# Patient Record
Sex: MALE | Race: WHITE | Employment: FULL TIME | URBAN - METROPOLITAN AREA
[De-identification: names, ages, dates, MRNs, and addresses within clinical notes are randomized per-mention and may not be internally consistent; named-entity substitution may affect disease eponyms.]

---

## 2017-06-27 ENCOUNTER — LAB ENCOUNTER (OUTPATIENT)
Dept: LAB | Facility: HOSPITAL | Age: 47
End: 2017-06-27
Payer: COMMERCIAL

## 2017-06-27 ENCOUNTER — HOSPITAL ENCOUNTER (OUTPATIENT)
Dept: CT IMAGING | Facility: HOSPITAL | Age: 47
Discharge: HOME | End: 2017-06-27
Payer: COMMERCIAL

## 2017-06-27 DIAGNOSIS — C69.91 MELANOMA OF EYE, RIGHT (HCC): Primary | ICD-10-CM

## 2017-06-27 DIAGNOSIS — C69.90: ICD-10-CM

## 2017-06-27 PROCEDURE — 80053 COMPREHEN METABOLIC PANEL: CPT

## 2017-06-27 PROCEDURE — 82248 BILIRUBIN DIRECT: CPT

## 2017-06-27 PROCEDURE — 36415 COLL VENOUS BLD VENIPUNCTURE: CPT

## 2017-06-27 PROCEDURE — 74170 CT ABD WO CNTRST FLWD CNTRST: CPT | Performed by: RADIOLOGY

## 2017-06-27 PROCEDURE — 71260 CT THORAX DX C+: CPT | Performed by: RADIOLOGY

## 2017-09-07 ENCOUNTER — OFFICE VISIT (OUTPATIENT)
Dept: FAMILY MEDICINE CLINIC | Facility: CLINIC | Age: 47
End: 2017-09-07

## 2017-09-07 VITALS
WEIGHT: 212 LBS | DIASTOLIC BLOOD PRESSURE: 86 MMHG | HEIGHT: 73 IN | HEART RATE: 61 BPM | OXYGEN SATURATION: 98 % | BODY MASS INDEX: 28.1 KG/M2 | SYSTOLIC BLOOD PRESSURE: 132 MMHG | RESPIRATION RATE: 18 BRPM | TEMPERATURE: 99 F

## 2017-09-07 DIAGNOSIS — M10.071 ACUTE IDIOPATHIC GOUT INVOLVING TOE OF RIGHT FOOT: Primary | ICD-10-CM

## 2017-09-07 PROCEDURE — 99213 OFFICE O/P EST LOW 20 MIN: CPT | Performed by: FAMILY MEDICINE

## 2017-09-07 RX ORDER — PREDNISONE 20 MG/1
TABLET ORAL
Qty: 10 TABLET | Refills: 6 | Status: SHIPPED | OUTPATIENT
Start: 2017-09-07 | End: 2018-08-31

## 2017-09-07 RX ORDER — HYDROCODONE BITARTRATE AND ACETAMINOPHEN 10; 325 MG/1; MG/1
1 TABLET ORAL EVERY 6 HOURS PRN
Qty: 60 TABLET | Refills: 0 | Status: SHIPPED | OUTPATIENT
Start: 2017-09-07 | End: 2020-07-20

## 2017-09-07 NOTE — PROGRESS NOTES
Here for 2 primary reasons. First is to discuss recurrence of gout in his right foot. This time it began in the hindfoot and migrated up to the great toe. Previous x-rays have shown some early bony destruction and cystic changes.   Patient feels he get

## 2018-07-03 ENCOUNTER — HOSPITAL ENCOUNTER (OUTPATIENT)
Dept: CARDIOLOGY CLINIC | Facility: HOSPITAL | Age: 48
Discharge: HOME | End: 2018-07-03
Attending: INTERNAL MEDICINE

## 2018-07-03 DIAGNOSIS — Z91.89 STROKE RISK: ICD-10-CM

## 2018-08-31 ENCOUNTER — APPOINTMENT (OUTPATIENT)
Dept: GENERAL RADIOLOGY | Age: 48
End: 2018-08-31
Attending: EMERGENCY MEDICINE
Payer: COMMERCIAL

## 2018-08-31 ENCOUNTER — HOSPITAL ENCOUNTER (EMERGENCY)
Age: 48
Discharge: HOME | End: 2018-08-31
Attending: EMERGENCY MEDICINE
Payer: COMMERCIAL

## 2018-08-31 VITALS
OXYGEN SATURATION: 98 % | WEIGHT: 220 LBS | HEART RATE: 75 BPM | RESPIRATION RATE: 16 BRPM | BODY MASS INDEX: 29.16 KG/M2 | TEMPERATURE: 99 F | SYSTOLIC BLOOD PRESSURE: 186 MMHG | HEIGHT: 73 IN | DIASTOLIC BLOOD PRESSURE: 93 MMHG

## 2018-08-31 DIAGNOSIS — S63.502A LEFT WRIST SPRAIN, INITIAL ENCOUNTER: Primary | ICD-10-CM

## 2018-08-31 PROCEDURE — 99283 EMERGENCY DEPT VISIT LOW MDM: CPT

## 2018-08-31 PROCEDURE — 96372 THER/PROPH/DIAG INJ SC/IM: CPT

## 2018-08-31 PROCEDURE — 99284 EMERGENCY DEPT VISIT MOD MDM: CPT

## 2018-08-31 PROCEDURE — 73110 X-RAY EXAM OF WRIST: CPT | Performed by: EMERGENCY MEDICINE

## 2018-08-31 RX ORDER — KETOROLAC TROMETHAMINE 10 MG/1
10 TABLET, FILM COATED ORAL EVERY 6 HOURS PRN
Qty: 30 TABLET | Refills: 0 | Status: SHIPPED | OUTPATIENT
Start: 2018-08-31 | End: 2018-09-07

## 2018-08-31 RX ORDER — KETOROLAC TROMETHAMINE 30 MG/ML
60 INJECTION, SOLUTION INTRAMUSCULAR; INTRAVENOUS ONCE
Status: COMPLETED | OUTPATIENT
Start: 2018-08-31 | End: 2018-08-31

## 2018-08-31 NOTE — ED INITIAL ASSESSMENT (HPI)
Pt was working out on Tuesday doing push ups and different exercises had some pain then yesterday caught his 9 yr old from jumping off [employer] having pain and swelling to left wrist and forearm.

## 2018-08-31 NOTE — ED PROVIDER NOTES
I reviewed that chart and discussed the case. I have examined the patient and noted patient is noted be neurovascular intact distal area of injury to his injury seems to be over the dorsum of the distal left forearm.   Full range of motion at the left elbo

## 2018-08-31 NOTE — ED NOTES
Patient declined short arm post mold. Chen Burnett \"I will freak out! . I cant wear that thing! \"   Patient asked for velcro wrist splint instead.

## 2018-11-13 ENCOUNTER — OFFICE VISIT (OUTPATIENT)
Dept: FAMILY MEDICINE CLINIC | Facility: CLINIC | Age: 48
End: 2018-11-13
Payer: COMMERCIAL

## 2018-11-13 VITALS
BODY MASS INDEX: 28.89 KG/M2 | HEIGHT: 73 IN | WEIGHT: 218 LBS | HEART RATE: 67 BPM | DIASTOLIC BLOOD PRESSURE: 82 MMHG | RESPIRATION RATE: 16 BRPM | OXYGEN SATURATION: 98 % | SYSTOLIC BLOOD PRESSURE: 140 MMHG

## 2018-11-13 DIAGNOSIS — M10.9 ACUTE GOUT OF LEFT FOOT, UNSPECIFIED CAUSE: Primary | ICD-10-CM

## 2018-11-13 DIAGNOSIS — Z23 NEED FOR VACCINATION: ICD-10-CM

## 2018-11-13 PROCEDURE — 90471 IMMUNIZATION ADMIN: CPT | Performed by: FAMILY MEDICINE

## 2018-11-13 PROCEDURE — 99213 OFFICE O/P EST LOW 20 MIN: CPT | Performed by: FAMILY MEDICINE

## 2018-11-13 PROCEDURE — 90686 IIV4 VACC NO PRSV 0.5 ML IM: CPT | Performed by: FAMILY MEDICINE

## 2018-11-13 RX ORDER — HYDROCODONE BITARTRATE AND ACETAMINOPHEN 10; 325 MG/1; MG/1
1 TABLET ORAL EVERY 6 HOURS PRN
Qty: 15 TABLET | Refills: 0 | Status: SHIPPED | OUTPATIENT
Start: 2018-11-13

## 2018-11-13 RX ORDER — INDOMETHACIN 50 MG/1
50 CAPSULE ORAL
Qty: 30 CAPSULE | Refills: 0 | Status: SHIPPED | OUTPATIENT
Start: 2018-11-13 | End: 2019-09-14

## 2018-11-13 NOTE — PROGRESS NOTES
HPI:    Patient ID: Román Downs is a 46year old male. Gout   This is a new problem. Episode onset: 10 days. The problem occurs daily. The problem has been unchanged. Associated symptoms comments: Gout in left foot. Exacerbated by: activity.  He has tablet by mouth every 6 (six) hours as needed for Pain. Dispense: 15 tablet; Refill: 0    2.  Need for vaccination  - FLULAVAL INFLUENZA VACCINE QUAD PRESERVATIVE FREE 0.5 ML    Orders Placed This Encounter      FLULAVAL INFLUENZA VACCINE QUAD PRESERVATIVE

## 2018-11-13 NOTE — PATIENT INSTRUCTIONS
Gout Diet  Gout is a painful condition caused by an excess of uric acid, a waste product made by the body. Uric acid forms crystals that collect in the joints. The immune response to these crystals brings on symptoms of joint pain and swelling.  This is c · Dairy products that are low-fat or fat-free, such as cheese and yogurt  · Complex carbohydrate foods, including whole grains, brown rice, oats, and beans  · Coffee, in moderation  · Water, approximately 64 ounces per day  Follow-up care  Follow up with gela

## 2018-12-11 ENCOUNTER — LAB ENCOUNTER (OUTPATIENT)
Dept: LAB | Age: 48
End: 2018-12-11
Attending: RADIOLOGY
Payer: COMMERCIAL

## 2018-12-11 DIAGNOSIS — C69.31 CHOROID MELANOMA OF RIGHT EYE (HCC): Primary | ICD-10-CM

## 2018-12-11 PROCEDURE — 36415 COLL VENOUS BLD VENIPUNCTURE: CPT

## 2018-12-11 PROCEDURE — 80053 COMPREHEN METABOLIC PANEL: CPT

## 2018-12-16 ENCOUNTER — HOSPITAL ENCOUNTER (OUTPATIENT)
Dept: ULTRASOUND IMAGING | Age: 48
Discharge: HOME | End: 2018-12-16
Attending: RADIOLOGY
Payer: COMMERCIAL

## 2018-12-16 ENCOUNTER — HOSPITAL ENCOUNTER (OUTPATIENT)
Dept: CT IMAGING | Age: 48
Discharge: HOME | End: 2018-12-16
Attending: RADIOLOGY
Payer: COMMERCIAL

## 2018-12-16 DIAGNOSIS — C69.31: ICD-10-CM

## 2018-12-16 PROCEDURE — 71260 CT THORAX DX C+: CPT | Performed by: RADIOLOGY

## 2018-12-16 PROCEDURE — 76700 US EXAM ABDOM COMPLETE: CPT | Performed by: RADIOLOGY

## 2019-09-14 ENCOUNTER — TELEPHONE (OUTPATIENT)
Dept: FAMILY MEDICINE CLINIC | Facility: CLINIC | Age: 49
End: 2019-09-14

## 2019-09-14 DIAGNOSIS — M10.9 ACUTE GOUT OF LEFT FOOT, UNSPECIFIED CAUSE: ICD-10-CM

## 2019-09-17 RX ORDER — INDOMETHACIN 50 MG/1
CAPSULE ORAL
Qty: 30 CAPSULE | Refills: 0 | Status: SHIPPED | OUTPATIENT
Start: 2019-09-17 | End: 2020-07-20

## 2020-07-20 ENCOUNTER — VIRTUAL PHONE E/M (OUTPATIENT)
Dept: FAMILY MEDICINE CLINIC | Facility: CLINIC | Age: 50
End: 2020-07-20
Payer: COMMERCIAL

## 2020-07-20 DIAGNOSIS — M10.9 ACUTE GOUT OF RIGHT FOOT, UNSPECIFIED CAUSE: ICD-10-CM

## 2020-07-20 PROCEDURE — 99213 OFFICE O/P EST LOW 20 MIN: CPT | Performed by: INTERNAL MEDICINE

## 2020-07-20 RX ORDER — HYDROCODONE BITARTRATE AND ACETAMINOPHEN 10; 325 MG/1; MG/1
1 TABLET ORAL EVERY 6 HOURS PRN
Qty: 15 TABLET | Refills: 0 | Status: SHIPPED | OUTPATIENT
Start: 2020-07-20 | End: 2021-05-21

## 2020-07-20 RX ORDER — INDOMETHACIN 50 MG/1
50 CAPSULE ORAL
Qty: 30 CAPSULE | Refills: 0 | Status: SHIPPED | OUTPATIENT
Start: 2020-07-20

## 2020-07-20 NOTE — PROGRESS NOTES
Virtual Telephone Check-In    Naty Jordan verbally consents to a Virtual/Telephone Check-In visit on 07/20/20. Patient has been referred to the [facility] website at [email or web address] to review the yearly Consent to Treat document.     Patient unders RESPIRATORY: denies shortness of breath or cough  CARDIOVASCULAR: denies chest pain or pressure  MS: right great toe pain, + swelling, + redness  NEURO: denies  Weakness; denies numbness or tingling    EXAM:   There were no vitals taken for this visit.

## 2021-04-09 DIAGNOSIS — Z23 NEED FOR VACCINATION: ICD-10-CM

## 2021-05-21 ENCOUNTER — OFFICE VISIT (OUTPATIENT)
Dept: FAMILY MEDICINE CLINIC | Facility: CLINIC | Age: 51
End: 2021-05-21
Payer: COMMERCIAL

## 2021-05-21 VITALS
SYSTOLIC BLOOD PRESSURE: 160 MMHG | HEART RATE: 101 BPM | RESPIRATION RATE: 18 BRPM | OXYGEN SATURATION: 98 % | DIASTOLIC BLOOD PRESSURE: 100 MMHG

## 2021-05-21 DIAGNOSIS — M10.9 ACUTE GOUT OF RIGHT FOOT, UNSPECIFIED CAUSE: Primary | ICD-10-CM

## 2021-05-21 PROCEDURE — 3077F SYST BP >= 140 MM HG: CPT | Performed by: FAMILY MEDICINE

## 2021-05-21 PROCEDURE — 3080F DIAST BP >= 90 MM HG: CPT | Performed by: FAMILY MEDICINE

## 2021-05-21 PROCEDURE — 99213 OFFICE O/P EST LOW 20 MIN: CPT | Performed by: FAMILY MEDICINE

## 2021-05-21 RX ORDER — INDOMETHACIN 50 MG/1
50 CAPSULE ORAL
Qty: 30 CAPSULE | Refills: 1 | Status: SHIPPED | OUTPATIENT
Start: 2021-05-21

## 2021-05-21 RX ORDER — HYDROCODONE BITARTRATE AND ACETAMINOPHEN 10; 325 MG/1; MG/1
1 TABLET ORAL EVERY 6 HOURS PRN
Qty: 30 TABLET | Refills: 0 | Status: SHIPPED | OUTPATIENT
Start: 2021-05-21 | End: 2021-06-20

## 2021-05-21 RX ORDER — COLCHICINE 0.6 MG/1
CAPSULE ORAL
Qty: 6 CAPSULE | Refills: 2 | Status: SHIPPED | OUTPATIENT
Start: 2021-05-21 | End: 2021-05-26

## 2021-05-21 NOTE — PROGRESS NOTES
Has been dealing throughout the last 8 years with intermittent gout attacks. He was rationing his medications only taking indomethacin once a day over the last month. His right foot has gotten out of control and is walking with a crutch today.   There is erythematosus) Sister        PHYSICAL EXAM:  BP (!) 160/100   Pulse 101   Resp 18   SpO2 98%     Foot significantly swollen. Warm from the first MTP joint across to the distal fifth metatarsal.  Exquisitely tender.   No skin breakdown noted in between the

## 2021-06-02 ENCOUNTER — TELEPHONE (OUTPATIENT)
Dept: FAMILY MEDICINE CLINIC | Facility: CLINIC | Age: 51
End: 2021-06-02

## 2021-06-02 DIAGNOSIS — M10.9 ACUTE GOUT OF RIGHT FOOT, UNSPECIFIED CAUSE: ICD-10-CM

## 2021-06-02 NOTE — TELEPHONE ENCOUNTER
Pt in recently for gout right foot. Now pt experiencing gout sx in left foot and \"this has never happened before\". He wants Dr. Keren Colin to be aware and would like his advice/thoughts on this.

## 2021-06-02 NOTE — TELEPHONE ENCOUNTER
Spoke to pt he wishes to hold off on Allopurinol at this time. Pt states he does not want to take daily pill.

## 2021-06-02 NOTE — TELEPHONE ENCOUNTER
Patient seen on 5/21: right foot, swelling, gout. Patient reports the next day:  He could tell after starting colchicine, he was slowly getting better. Currently right toe joint is tender, not painful to walk on. Completed 5 day colchicine. Yesterday, left lateral foot started hurting, never has happened on the left foot, started second round of colchicine. Left foot, severity not like right foot since he took the medication right away. Tries to avoid norco has only took 2 pills since OV, taking indomethacin. Dr. Spring Davis, patient requesting refill of colchicine (patient has one more refill at pharmacy), and wants to know how often to be taking the indomethacin? He is concerned as to why he is having pain improved with chol in the left lateral foot?

## 2021-06-29 ENCOUNTER — TELEMEDICINE (OUTPATIENT)
Dept: FAMILY MEDICINE CLINIC | Facility: CLINIC | Age: 51
End: 2021-06-29
Payer: COMMERCIAL

## 2021-06-29 DIAGNOSIS — M10.9 ACUTE GOUT OF LEFT FOOT, UNSPECIFIED CAUSE: ICD-10-CM

## 2021-06-29 DIAGNOSIS — M10.9 ACUTE GOUT OF RIGHT FOOT, UNSPECIFIED CAUSE: Primary | ICD-10-CM

## 2021-06-29 PROCEDURE — 99213 OFFICE O/P EST LOW 20 MIN: CPT | Performed by: FAMILY MEDICINE

## 2021-06-29 RX ORDER — PREDNISONE 20 MG/1
TABLET ORAL
Qty: 12 TABLET | Refills: 0 | Status: SHIPPED | OUTPATIENT
Start: 2021-06-29 | End: 2021-07-08

## 2021-06-29 NOTE — PROGRESS NOTES
This visit is conducted using Telemedicine with live, interactive video and audio. Patient has been referred to the [facility] website at [email or web address] to review the yearly Consent to Treat document.     Patient understands and accepts financial res capsule 1   • indomethacin 50 MG Oral Cap Take 1 capsule (50 mg total) by mouth 3 (three) times daily with meals. 30 capsule 0   • HYDROcodone-acetaminophen  MG Oral Tab Take 1 tablet by mouth every 6 (six) hours as needed for Pain.  15 tablet 0

## 2021-08-02 ENCOUNTER — TELEPHONE (OUTPATIENT)
Dept: FAMILY MEDICINE CLINIC | Facility: CLINIC | Age: 51
End: 2021-08-02

## 2021-08-02 DIAGNOSIS — M10.9 ACUTE GOUT OF RIGHT FOOT, UNSPECIFIED CAUSE: Primary | ICD-10-CM

## 2021-08-02 NOTE — TELEPHONE ENCOUNTER
Pt said his gout is back - pt said last visit was recommended if returned to get longer course of colchicine along with allopurinol.   Pls advise

## 2021-08-02 NOTE — TELEPHONE ENCOUNTER
Let's get a CMP and a uric acid first, to make sure it's safe to do so, and that were on the right track    Radha Spencer MD

## 2021-08-03 ENCOUNTER — LAB ENCOUNTER (OUTPATIENT)
Dept: LAB | Age: 51
End: 2021-08-03
Attending: FAMILY MEDICINE
Payer: COMMERCIAL

## 2021-08-03 DIAGNOSIS — M10.9 ACUTE GOUT OF RIGHT FOOT, UNSPECIFIED CAUSE: ICD-10-CM

## 2021-08-03 LAB
ALBUMIN SERPL-MCNC: 3.9 G/DL (ref 3.4–5)
ALBUMIN/GLOB SERPL: 1.1 {RATIO} (ref 1–2)
ALP LIVER SERPL-CCNC: 60 U/L
ALT SERPL-CCNC: 24 U/L
ANION GAP SERPL CALC-SCNC: 4 MMOL/L (ref 0–18)
AST SERPL-CCNC: 19 U/L (ref 15–37)
BILIRUB SERPL-MCNC: 0.4 MG/DL (ref 0.1–2)
BUN BLD-MCNC: 16 MG/DL (ref 7–18)
CALCIUM BLD-MCNC: 8.8 MG/DL (ref 8.5–10.1)
CHLORIDE SERPL-SCNC: 108 MMOL/L (ref 98–112)
CO2 SERPL-SCNC: 28 MMOL/L (ref 21–32)
CREAT BLD-MCNC: 1.2 MG/DL
GLOBULIN PLAS-MCNC: 3.4 G/DL (ref 2.8–4.4)
GLUCOSE BLD-MCNC: 89 MG/DL (ref 70–99)
M PROTEIN MFR SERPL ELPH: 7.3 G/DL (ref 6.4–8.2)
OSMOLALITY SERPL CALC.SUM OF ELEC: 291 MOSM/KG (ref 275–295)
PATIENT FASTING Y/N/NP: NO
POTASSIUM SERPL-SCNC: 4.3 MMOL/L (ref 3.5–5.1)
SODIUM SERPL-SCNC: 140 MMOL/L (ref 136–145)
URATE SERPL-MCNC: 6.9 MG/DL

## 2021-08-03 PROCEDURE — 80053 COMPREHEN METABOLIC PANEL: CPT

## 2021-08-03 PROCEDURE — 36415 COLL VENOUS BLD VENIPUNCTURE: CPT

## 2021-08-03 PROCEDURE — 84550 ASSAY OF BLOOD/URIC ACID: CPT

## 2021-08-06 RX ORDER — COLCHICINE 0.6 MG/1
1 CAPSULE ORAL DAILY
Qty: 30 CAPSULE | Refills: 1 | Status: SHIPPED | OUTPATIENT
Start: 2021-08-06 | End: 2021-09-28

## 2021-08-06 RX ORDER — ALLOPURINOL 100 MG/1
100 TABLET ORAL DAILY
Qty: 30 TABLET | Refills: 1 | Status: SHIPPED | OUTPATIENT
Start: 2021-08-06 | End: 2021-08-09

## 2021-08-06 NOTE — TELEPHONE ENCOUNTER
Will start colchicine and allopurinol, if not resolving to let us know.     Recheck labs in 1 month    Emily Smith MD

## 2021-08-09 ENCOUNTER — TELEPHONE (OUTPATIENT)
Dept: FAMILY MEDICINE CLINIC | Facility: CLINIC | Age: 51
End: 2021-08-09

## 2021-08-09 DIAGNOSIS — M10.9 ACUTE GOUT OF RIGHT FOOT, UNSPECIFIED CAUSE: ICD-10-CM

## 2021-08-09 RX ORDER — ALLOPURINOL 100 MG/1
100 TABLET ORAL DAILY
Qty: 90 TABLET | Refills: 0 | Status: SHIPPED | OUTPATIENT
Start: 2021-08-09

## 2021-08-09 NOTE — TELEPHONE ENCOUNTER
Colchicine (MITIGARE)   Pt needs to know when he is suppose to take this, only when he has a flare up? ?  Please call

## 2021-08-09 NOTE — TELEPHONE ENCOUNTER
The start of allopurinol can increase gout flares, even though long term it reduces them, so when I can I treat people daily with colchicine to prevent flares for the early part of allopurinol    Radha Spencer MD

## 2021-09-02 ENCOUNTER — HOSPITAL ENCOUNTER (OUTPATIENT)
Age: 51
Discharge: HOME | End: 2021-09-02
Payer: COMMERCIAL

## 2021-09-02 VITALS
BODY MASS INDEX: 28.44 KG/M2 | HEART RATE: 79 BPM | SYSTOLIC BLOOD PRESSURE: 152 MMHG | DIASTOLIC BLOOD PRESSURE: 95 MMHG | HEIGHT: 72 IN | WEIGHT: 210 LBS | TEMPERATURE: 98 F | OXYGEN SATURATION: 100 % | RESPIRATION RATE: 16 BRPM

## 2021-09-02 DIAGNOSIS — R42 VERTIGO: Primary | ICD-10-CM

## 2021-09-02 PROCEDURE — 99213 OFFICE O/P EST LOW 20 MIN: CPT

## 2021-09-02 PROCEDURE — 99203 OFFICE O/P NEW LOW 30 MIN: CPT

## 2021-09-02 RX ORDER — MECLIZINE HCL 12.5 MG/1
25 TABLET ORAL ONCE
Status: COMPLETED | OUTPATIENT
Start: 2021-09-02 | End: 2021-09-02

## 2021-09-02 RX ORDER — MECLIZINE HYDROCHLORIDE 25 MG/1
25 TABLET ORAL 3 TIMES DAILY PRN
Qty: 20 TABLET | Refills: 0 | Status: SHIPPED | OUTPATIENT
Start: 2021-09-02

## 2021-09-02 NOTE — ED PROVIDER NOTES
Patient Seen in: [facility]      History   Patient presents with:  Dizziness    Stated Complaint: dizziness    HPI/Subjective:   HPI  Patient is 53-year-old male with past medical history of history of GIST tumor removal 2004, malignant m status: Never Smoker      Smokeless tobacco: Never Used    Vaping Use      Vaping Use: Never used    Alcohol use: Yes    Drug use: No             Review of Systems    Positive for stated complaint: dizziness  Other systems are as noted in HPI.   Constitutio person, place, and time. GCS: GCS eye subscore is 4. GCS verbal subscore is 5. GCS motor subscore is 6. Cranial Nerves: No cranial nerve deficit, dysarthria or facial asymmetry. Sensory: No sensory deficit.       Motor: No weakness or abnorma Clinical Impression:  Vertigo  (primary encounter diagnosis)     Disposition:  Discharge  9/2/2021  2:57 pm    Follow-up:  Marline Rios MD  [facility] [address] [[email or web address]] [address] [place] [address]  [phone]    Schedule an appointment as soon as possible for a

## 2021-09-24 ENCOUNTER — LAB ENCOUNTER (OUTPATIENT)
Dept: LAB | Age: 51
End: 2021-09-24
Attending: FAMILY MEDICINE
Payer: COMMERCIAL

## 2021-09-24 DIAGNOSIS — M10.9 ACUTE GOUT OF RIGHT FOOT, UNSPECIFIED CAUSE: ICD-10-CM

## 2021-09-24 LAB
ALBUMIN SERPL-MCNC: 4.5 G/DL (ref 3.4–5)
ALBUMIN/GLOB SERPL: 1.3 {RATIO} (ref 1–2)
ALP LIVER SERPL-CCNC: 48 U/L
ALT SERPL-CCNC: 44 U/L
ANION GAP SERPL CALC-SCNC: 3 MMOL/L (ref 0–18)
AST SERPL-CCNC: 35 U/L (ref 15–37)
BILIRUB SERPL-MCNC: 0.5 MG/DL (ref 0.1–2)
BUN BLD-MCNC: 18 MG/DL (ref 7–18)
CALCIUM BLD-MCNC: 9.8 MG/DL (ref 8.5–10.1)
CHLORIDE SERPL-SCNC: 107 MMOL/L (ref 98–112)
CO2 SERPL-SCNC: 29 MMOL/L (ref 21–32)
CREAT BLD-MCNC: 0.97 MG/DL
GLOBULIN PLAS-MCNC: 3.5 G/DL (ref 2.8–4.4)
GLUCOSE BLD-MCNC: 82 MG/DL (ref 70–99)
OSMOLALITY SERPL CALC.SUM OF ELEC: 289 MOSM/KG (ref 275–295)
PATIENT FASTING Y/N/NP: NO
POTASSIUM SERPL-SCNC: 4.2 MMOL/L (ref 3.5–5.1)
PROT SERPL-MCNC: 8 G/DL (ref 6.4–8.2)
SODIUM SERPL-SCNC: 139 MMOL/L (ref 136–145)
URATE SERPL-MCNC: 6.7 MG/DL

## 2021-09-24 PROCEDURE — 80053 COMPREHEN METABOLIC PANEL: CPT

## 2021-09-24 PROCEDURE — 84550 ASSAY OF BLOOD/URIC ACID: CPT

## 2021-09-24 PROCEDURE — 36415 COLL VENOUS BLD VENIPUNCTURE: CPT

## 2021-09-28 ENCOUNTER — TELEPHONE (OUTPATIENT)
Dept: FAMILY MEDICINE CLINIC | Facility: CLINIC | Age: 51
End: 2021-09-28

## 2021-09-28 DIAGNOSIS — M10.9 ACUTE GOUT OF RIGHT FOOT, UNSPECIFIED CAUSE: ICD-10-CM

## 2021-09-28 RX ORDER — COLCHICINE 0.6 MG/1
1 CAPSULE ORAL DAILY
Qty: 30 CAPSULE | Refills: 1 | Status: SHIPPED | OUTPATIENT
Start: 2021-09-28 | End: 2021-10-28

## 2021-09-28 NOTE — TELEPHONE ENCOUNTER
Please advise, do you want pt on Allopurinal twice a day or 2 tabs together? Also should he continue Colchine? How long?

## 2021-09-28 NOTE — TELEPHONE ENCOUNTER
Patient received a message from [facility] regarding his recent testing. He wants to know in doubling up the Allopurinol if they are to be taken together or 2 different times of the day. Also he is taking Colchicine.   Should he continue that and if so Plea

## 2021-12-08 ENCOUNTER — LAB ENCOUNTER (OUTPATIENT)
Dept: LAB | Age: 51
End: 2021-12-08
Attending: FAMILY MEDICINE
Payer: COMMERCIAL

## 2021-12-08 DIAGNOSIS — M10.9 ACUTE GOUT OF RIGHT FOOT, UNSPECIFIED CAUSE: ICD-10-CM

## 2021-12-08 DIAGNOSIS — M10.9 ACUTE GOUT OF LEFT FOOT, UNSPECIFIED CAUSE: ICD-10-CM

## 2021-12-08 PROCEDURE — 80053 COMPREHEN METABOLIC PANEL: CPT

## 2021-12-08 PROCEDURE — 84550 ASSAY OF BLOOD/URIC ACID: CPT

## 2021-12-08 PROCEDURE — 36415 COLL VENOUS BLD VENIPUNCTURE: CPT

## 2021-12-15 ENCOUNTER — TELEPHONE (OUTPATIENT)
Dept: FAMILY MEDICINE CLINIC | Facility: CLINIC | Age: 51
End: 2021-12-15

## 2021-12-15 DIAGNOSIS — M10.9 ACUTE GOUT OF LEFT FOOT, UNSPECIFIED CAUSE: ICD-10-CM

## 2021-12-15 DIAGNOSIS — M10.9 ACUTE GOUT OF RIGHT FOOT, UNSPECIFIED CAUSE: Primary | ICD-10-CM

## 2021-12-15 NOTE — TELEPHONE ENCOUNTER
Patient called to respond to comments in his lab results. RE: Uric Acid    1. He is fine with increasing to 300mg. (3 X 100mg)  Patient has 29 pills left and wanted to know if RH wants this done for a trial period.   If it the trial is to exceed 9 day

## 2021-12-16 NOTE — TELEPHONE ENCOUNTER
Dr. Stormy Griffiths, please see refill request for Allopruinol and do you want patient on colchicine?

## 2021-12-17 RX ORDER — ALLOPURINOL 300 MG/1
300 TABLET ORAL DAILY
Qty: 30 TABLET | Refills: 1 | Status: SHIPPED | OUTPATIENT
Start: 2021-12-17 | End: 2021-12-17

## 2021-12-17 RX ORDER — COLCHICINE 0.6 MG/1
0.6 TABLET ORAL DAILY
Qty: 30 TABLET | Refills: 1 | Status: SHIPPED | OUTPATIENT
Start: 2021-12-17

## 2021-12-17 RX ORDER — ALLOPURINOL 300 MG/1
300 TABLET ORAL DAILY
Qty: 90 TABLET | Refills: 0 | Status: SHIPPED | OUTPATIENT
Start: 2021-12-17

## 2021-12-17 NOTE — TELEPHONE ENCOUNTER
Plan to continue colchicine until uric acid at goal. So will refill, recommend rechecking uric acid and cmp next month    Luis Childs MD

## 2022-02-11 ENCOUNTER — LAB ENCOUNTER (OUTPATIENT)
Dept: LAB | Age: 52
End: 2022-02-11
Attending: FAMILY MEDICINE
Payer: COMMERCIAL

## 2022-02-11 DIAGNOSIS — M10.9 ACUTE GOUT OF RIGHT FOOT, UNSPECIFIED CAUSE: ICD-10-CM

## 2022-02-11 DIAGNOSIS — M10.9 ACUTE GOUT OF LEFT FOOT, UNSPECIFIED CAUSE: ICD-10-CM

## 2022-02-11 LAB
ALBUMIN SERPL-MCNC: 4.1 G/DL (ref 3.4–5)
ALBUMIN/GLOB SERPL: 1.5 {RATIO} (ref 1–2)
ALP LIVER SERPL-CCNC: 56 U/L
ALT SERPL-CCNC: 37 U/L
AST SERPL-CCNC: 24 U/L (ref 15–37)
BILIRUB SERPL-MCNC: 0.5 MG/DL (ref 0.1–2)
BUN BLD-MCNC: 14 MG/DL (ref 7–18)
CHLORIDE SERPL-SCNC: 109 MMOL/L (ref 98–112)
CO2 SERPL-SCNC: 27 MMOL/L (ref 21–32)
CREAT BLD-MCNC: 1.06 MG/DL
FASTING STATUS PATIENT QL REPORTED: YES
GLOBULIN PLAS-MCNC: 2.7 G/DL (ref 2.8–4.4)
GLUCOSE BLD-MCNC: 101 MG/DL (ref 70–99)
OSMOLALITY SERPL CALC.SUM OF ELEC: 293 MOSM/KG (ref 275–295)
POTASSIUM SERPL-SCNC: 4.4 MMOL/L (ref 3.5–5.1)
PROT SERPL-MCNC: 6.8 G/DL (ref 6.4–8.2)
SODIUM SERPL-SCNC: 141 MMOL/L (ref 136–145)
URATE SERPL-MCNC: 4.4 MG/DL

## 2022-02-11 PROCEDURE — 80053 COMPREHEN METABOLIC PANEL: CPT

## 2022-02-11 PROCEDURE — 36415 COLL VENOUS BLD VENIPUNCTURE: CPT

## 2022-02-11 PROCEDURE — 84550 ASSAY OF BLOOD/URIC ACID: CPT

## 2022-02-17 ENCOUNTER — TELEPHONE (OUTPATIENT)
Dept: FAMILY MEDICINE CLINIC | Facility: CLINIC | Age: 52
End: 2022-02-17

## 2022-02-17 NOTE — TELEPHONE ENCOUNTER
Pt having gout flare up today. Taking allopurinol, pt has a couple norco left- requesting refill  Not taking Colchine- discontinued. Approve/deny?

## 2022-02-18 RX ORDER — HYDROCODONE BITARTRATE AND ACETAMINOPHEN 10; 325 MG/1; MG/1
1 TABLET ORAL EVERY 6 HOURS PRN
Qty: 15 TABLET | Refills: 0 | Status: SHIPPED | OUTPATIENT
Start: 2022-02-18

## 2022-02-18 RX ORDER — COLCHICINE 0.6 MG/1
TABLET ORAL
Qty: 30 TABLET | Refills: 1 | Status: SHIPPED | OUTPATIENT
Start: 2022-02-18

## 2022-02-18 NOTE — TELEPHONE ENCOUNTER
Should also still use an acute medicine to treat flare ups, either indomethacin, prednisone, or colchicine, just doesn't need to be on colchicine daily ongoing.  I'm ok with filling one of those as well if having an acute flare          Beatrice Jalloh MD

## 2022-02-18 NOTE — TELEPHONE ENCOUNTER
Dr Yayo Diaz, I'll reorder the colchicine for pt. His old Rx said daily. Do you want it changed to PRN for flares?

## 2022-02-18 NOTE — TELEPHONE ENCOUNTER
Pt informed. He is still having flare, is worse each of the past 3 mornings, so he will also do colchicine regime at this time too. Will notify us next week if he is still having pain.

## 2022-05-11 ENCOUNTER — TELEPHONE (OUTPATIENT)
Dept: FAMILY MEDICINE CLINIC | Facility: CLINIC | Age: 52
End: 2022-05-11

## 2022-05-11 NOTE — TELEPHONE ENCOUNTER
Yes, typically this is indefinite. Sometimes will stop if people have dramatic dietary or lifestyle changes. As it only stops gout attacks from occurring while taking it, it doesn't resolve any underlying condition.     Kelton Mcelroy MD

## 2022-05-11 NOTE — TELEPHONE ENCOUNTER
Pt states he has been taking allopurinol 300mg daily and having good results only a few minor flare-ups. Pt wants to know if he will take allopurinol indefinitely?  Please advise

## 2022-08-08 ENCOUNTER — OFFICE VISIT (OUTPATIENT)
Dept: FAMILY MEDICINE CLINIC | Facility: CLINIC | Age: 52
End: 2022-08-08
Payer: COMMERCIAL

## 2022-08-08 VITALS
BODY MASS INDEX: 30.61 KG/M2 | RESPIRATION RATE: 16 BRPM | WEIGHT: 226 LBS | OXYGEN SATURATION: 98 % | HEIGHT: 72 IN | SYSTOLIC BLOOD PRESSURE: 128 MMHG | DIASTOLIC BLOOD PRESSURE: 80 MMHG | HEART RATE: 76 BPM

## 2022-08-08 DIAGNOSIS — M10.9 ACUTE GOUT OF RIGHT FOOT, UNSPECIFIED CAUSE: Primary | ICD-10-CM

## 2022-08-08 DIAGNOSIS — R42 VERTIGO: ICD-10-CM

## 2022-08-08 PROCEDURE — 99214 OFFICE O/P EST MOD 30 MIN: CPT | Performed by: FAMILY MEDICINE

## 2022-08-08 PROCEDURE — 3079F DIAST BP 80-89 MM HG: CPT | Performed by: FAMILY MEDICINE

## 2022-08-08 PROCEDURE — 3074F SYST BP LT 130 MM HG: CPT | Performed by: FAMILY MEDICINE

## 2022-08-08 PROCEDURE — 3008F BODY MASS INDEX DOCD: CPT | Performed by: FAMILY MEDICINE

## 2022-08-08 RX ORDER — COLCHICINE 0.6 MG/1
TABLET ORAL
Qty: 30 TABLET | Refills: 1 | Status: SHIPPED | OUTPATIENT
Start: 2022-08-08

## 2022-08-08 RX ORDER — PREDNISONE 20 MG/1
40 TABLET ORAL DAILY
Qty: 10 TABLET | Refills: 0 | Status: SHIPPED | OUTPATIENT
Start: 2022-08-08 | End: 2022-08-13

## 2022-08-08 RX ORDER — ALLOPURINOL 100 MG/1
TABLET ORAL
Qty: 84 TABLET | Refills: 0 | Status: SHIPPED | OUTPATIENT
Start: 2022-08-08 | End: 2022-09-19

## 2023-05-10 ENCOUNTER — TELEPHONE (OUTPATIENT)
Dept: FAMILY MEDICINE CLINIC | Facility: CLINIC | Age: 53
End: 2023-05-10

## 2023-05-10 DIAGNOSIS — M10.9 ACUTE GOUT OF LEFT FOOT, UNSPECIFIED CAUSE: ICD-10-CM

## 2023-05-10 RX ORDER — PREDNISONE 20 MG/1
TABLET ORAL
Qty: 12 TABLET | Refills: 0 | Status: SHIPPED | OUTPATIENT
Start: 2023-05-10 | End: 2023-05-19

## 2023-05-10 RX ORDER — HYDROCODONE BITARTRATE AND ACETAMINOPHEN 10; 325 MG/1; MG/1
1 TABLET ORAL EVERY 6 HOURS PRN
Qty: 15 TABLET | Refills: 0 | Status: SHIPPED | OUTPATIENT
Start: 2023-05-10

## 2023-05-10 NOTE — TELEPHONE ENCOUNTER
Pt thinks the colchicine script needs to be increased as it's not helping with his gout. He's been taking as prescribed since Saturday and not getting relief. He also wants refill of the hydrocodone.   pls call to discuss

## 2023-05-10 NOTE — TELEPHONE ENCOUNTER
Gout flare up to right index finger red,swollen and painful. Sx started Saturday morning. Pt states no relief with Colchicine.   Pt wants to know if he can extend Colchicine dose and get refill on Norco?  Last OV 8/8/22 with Dr Agatha Mobley

## 2023-05-10 NOTE — TELEPHONE ENCOUNTER
Could also consider a steroid course if colchicine not working. Will send in pain medication, but if up for it will send in prednisone course. Should follow up if not improving regardless of next step.     Cornelia Hernandez MD

## 2023-05-10 NOTE — TELEPHONE ENCOUNTER
Pt states he does want to try Prednisone. Pt states he will schedule follow up appt. Please advise on Prednisone dose.

## 2023-11-14 ENCOUNTER — APPOINTMENT (OUTPATIENT)
Dept: GENERAL RADIOLOGY | Age: 53
End: 2023-11-14
Attending: EMERGENCY MEDICINE
Payer: COMMERCIAL

## 2023-11-14 ENCOUNTER — HOSPITAL ENCOUNTER (OUTPATIENT)
Age: 53
Discharge: HOME | End: 2023-11-14
Attending: EMERGENCY MEDICINE
Payer: COMMERCIAL

## 2023-11-14 VITALS
WEIGHT: 215 LBS | TEMPERATURE: 98 F | SYSTOLIC BLOOD PRESSURE: 121 MMHG | RESPIRATION RATE: 18 BRPM | OXYGEN SATURATION: 100 % | BODY MASS INDEX: 29 KG/M2 | DIASTOLIC BLOOD PRESSURE: 89 MMHG | HEART RATE: 78 BPM

## 2023-11-14 DIAGNOSIS — M10.9 ACUTE GOUT OF LEFT WRIST, UNSPECIFIED CAUSE: Primary | ICD-10-CM

## 2023-11-14 DIAGNOSIS — S63.502A SPRAIN OF LEFT WRIST, INITIAL ENCOUNTER: ICD-10-CM

## 2023-11-14 PROCEDURE — 99214 OFFICE O/P EST MOD 30 MIN: CPT

## 2023-11-14 PROCEDURE — 73110 X-RAY EXAM OF WRIST: CPT | Performed by: EMERGENCY MEDICINE

## 2023-11-14 PROCEDURE — 99213 OFFICE O/P EST LOW 20 MIN: CPT

## 2023-11-14 RX ORDER — PREDNISONE 20 MG/1
TABLET ORAL
Qty: 18 TABLET | Refills: 0 | Status: SHIPPED | OUTPATIENT
Start: 2023-11-14 | End: 2023-11-22

## 2023-11-14 RX ORDER — COLCHICINE 0.6 MG/1
CAPSULE ORAL
Qty: 15 CAPSULE | Refills: 0 | Status: SHIPPED | OUTPATIENT
Start: 2023-11-14 | End: 2023-12-14

## 2023-11-14 RX ORDER — NAPROXEN 500 MG/1
500 TABLET ORAL 2 TIMES DAILY PRN
Qty: 20 TABLET | Refills: 0 | Status: SHIPPED | OUTPATIENT
Start: 2023-11-14 | End: 2023-11-24

## 2023-11-14 NOTE — ED INITIAL ASSESSMENT (HPI)
C/o left wrist pain for 2 days. Pt reports he fell over a curb and caught himself with his arms. Pt reports too much pain for ROM. Pt reports can't hold anything. Pt reports prescribed meds used for pain. Bruising and swelling noted.

## 2023-11-15 DIAGNOSIS — M10.9 ACUTE GOUT OF LEFT FOOT, UNSPECIFIED CAUSE: ICD-10-CM

## 2023-11-15 RX ORDER — HYDROCODONE BITARTRATE AND ACETAMINOPHEN 10; 325 MG/1; MG/1
1 TABLET ORAL EVERY 6 HOURS PRN
Qty: 15 TABLET | Refills: 0 | Status: SHIPPED | OUTPATIENT
Start: 2023-11-15

## 2023-11-15 RX ORDER — HYDROCODONE BITARTRATE AND ACETAMINOPHEN 10; 325 MG/1; MG/1
1 TABLET ORAL EVERY 6 HOURS PRN
Qty: 15 TABLET | Refills: 0 | OUTPATIENT
Start: 2023-11-15

## 2023-11-15 NOTE — TELEPHONE ENCOUNTER
Last OV: 8/8/23  Last refill:  HYDROcodone-acetaminophen  MG Oral Tab 15 tablet 0 5/10/2023     Sig - Route:  Take 1 tablet by mouth every 6 (six) hours as needed for Pain. - Oral    Sent to pharmacy as: HYDROcodone-Acetaminophen  MG Oral Tablet (Norco)    Earliest Fill Date: 5/10/2023        Dr. Good Garcia, please advise on colchicine sig and approve/deny norco:

## 2023-11-15 NOTE — TELEPHONE ENCOUNTER
I think that colchicine dosing is fine for first day, ok to take 1 tablet of it daily for a few days after as well.     I will write for norco but recommend appointment soon, as it has been over a year since I saw Koren Leff last Carletta Collet, MD

## 2023-11-15 NOTE — TELEPHONE ENCOUNTER
Patient went to [place] to be treated for a fall. IC said that the fall must have triggered his gout flare up. He is a little concerned because. We refused his Norco.  He stated they gave him Naproxen but that doesn't touch the pain. It takes him from a 9 to a 7. The Isabel Aver works much better. He is down to 1 pill. He was also given Colchicine with directions that are not the same as what Dr. Shanna Stanton tells him to do. IC was very atoment about him not exceeding the dosage on the label. Here are IC instructions. Sig:   Take 1.2 mg by mouth As Directed AND 0.6 mg As Directed. Take 1.2 mg initially, take 0.6 mg after 1 hour. Max 3 pills per gout flare. Maggie Moctezuma Here are the instructions he uses during a flare up by Dr. Jerri Ross:   2 tabs at beginning of flare and 1 tab 2 hours later. Then repeat 1 tab daily for next 2-3 days.

## 2023-11-28 ENCOUNTER — OFFICE VISIT (OUTPATIENT)
Dept: FAMILY MEDICINE CLINIC | Facility: CLINIC | Age: 53
End: 2023-11-28
Payer: COMMERCIAL

## 2023-11-28 VITALS
HEIGHT: 72 IN | WEIGHT: 215 LBS | DIASTOLIC BLOOD PRESSURE: 90 MMHG | HEART RATE: 67 BPM | RESPIRATION RATE: 18 BRPM | OXYGEN SATURATION: 98 % | SYSTOLIC BLOOD PRESSURE: 150 MMHG | BODY MASS INDEX: 29.12 KG/M2

## 2023-11-28 DIAGNOSIS — M10.9 ACUTE GOUT OF LEFT HAND, UNSPECIFIED CAUSE: Primary | ICD-10-CM

## 2023-11-28 DIAGNOSIS — R20.0 NUMBNESS AND TINGLING: ICD-10-CM

## 2023-11-28 DIAGNOSIS — H93.13 TINNITUS OF BOTH EARS: ICD-10-CM

## 2023-11-28 DIAGNOSIS — R42 VERTIGO: ICD-10-CM

## 2023-11-28 DIAGNOSIS — R20.2 NUMBNESS AND TINGLING: ICD-10-CM

## 2023-11-28 PROCEDURE — 3077F SYST BP >= 140 MM HG: CPT | Performed by: FAMILY MEDICINE

## 2023-11-28 PROCEDURE — 3008F BODY MASS INDEX DOCD: CPT | Performed by: FAMILY MEDICINE

## 2023-11-28 PROCEDURE — 99213 OFFICE O/P EST LOW 20 MIN: CPT | Performed by: FAMILY MEDICINE

## 2023-11-28 PROCEDURE — 3080F DIAST BP >= 90 MM HG: CPT | Performed by: FAMILY MEDICINE

## 2023-11-28 RX ORDER — INDOMETHACIN 50 MG/1
50 CAPSULE ORAL
Qty: 15 CAPSULE | Refills: 1 | Status: SHIPPED | OUTPATIENT
Start: 2023-11-28

## 2024-02-02 DIAGNOSIS — M10.9 ACUTE GOUT OF RIGHT FOOT, UNSPECIFIED CAUSE: ICD-10-CM

## 2024-02-02 RX ORDER — COLCHICINE 0.6 MG/1
TABLET ORAL
Qty: 30 TABLET | Refills: 1 | Status: SHIPPED | OUTPATIENT
Start: 2024-02-02

## 2024-02-02 NOTE — TELEPHONE ENCOUNTER
.A refill request was received for:  Requested Prescriptions     Pending Prescriptions Disp Refills    colchicine 0.6 MG Oral Tab 30 tablet 1     Si tabs at beginning of flare and 1 tab 2 hours later. Then repeat 1 tab daily for next 2-3 days.       Last refill date:   2022    Last office visit: 2023    Follow up due:  No future appointments.

## 2024-09-18 DIAGNOSIS — M10.9 ACUTE GOUT OF LEFT FOOT, UNSPECIFIED CAUSE: ICD-10-CM

## 2024-09-18 RX ORDER — HYDROCODONE BITARTRATE AND ACETAMINOPHEN 10; 325 MG/1; MG/1
1 TABLET ORAL EVERY 6 HOURS PRN
Qty: 15 TABLET | Refills: 0 | Status: SHIPPED | OUTPATIENT
Start: 2024-09-18

## 2024-09-18 NOTE — TELEPHONE ENCOUNTER
A refill request was received for:  Requested Prescriptions     Pending Prescriptions Disp Refills    HYDROcodone-acetaminophen  MG Oral Tab 15 tablet 0     Sig: Take 1 tablet by mouth every 6 (six) hours as needed for Pain.       Last refill date:   11/15/2023    Last office visit: 11/28/2023    Follow up due:  No future appointments.     Attending Only

## 2025-03-25 DIAGNOSIS — M10.9 ACUTE GOUT OF RIGHT FOOT, UNSPECIFIED CAUSE: ICD-10-CM

## 2025-03-25 RX ORDER — COLCHICINE 0.6 MG/1
TABLET ORAL
Qty: 30 TABLET | Refills: 1 | OUTPATIENT
Start: 2025-03-25

## 2025-03-29 ENCOUNTER — TELEMEDICINE (OUTPATIENT)
Dept: FAMILY MEDICINE CLINIC | Facility: CLINIC | Age: 55
End: 2025-03-29
Payer: COMMERCIAL

## 2025-03-29 DIAGNOSIS — M1A.09X0 CHRONIC GOUT OF MULTIPLE SITES, UNSPECIFIED CAUSE: Primary | ICD-10-CM

## 2025-03-29 PROCEDURE — 98005 SYNCH AUDIO-VIDEO EST LOW 20: CPT | Performed by: INTERNAL MEDICINE

## 2025-03-29 RX ORDER — COLCHICINE 0.6 MG/1
TABLET ORAL
Qty: 30 TABLET | Refills: 2 | Status: SHIPPED | OUTPATIENT
Start: 2025-03-29

## 2025-03-29 RX ORDER — INDOMETHACIN 50 MG/1
50 CAPSULE ORAL
Qty: 15 CAPSULE | Refills: 2 | Status: SHIPPED | OUTPATIENT
Start: 2025-03-29
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
## 2025-07-22 ENCOUNTER — TELEMEDICINE (OUTPATIENT)
Dept: FAMILY MEDICINE CLINIC | Facility: CLINIC | Age: 55
End: 2025-07-22
Payer: COMMERCIAL

## 2025-07-22 DIAGNOSIS — M10.9 ACUTE GOUT OF LEFT FOOT, UNSPECIFIED CAUSE: ICD-10-CM

## 2025-07-22 PROCEDURE — 98004 SYNCH AUDIO-VIDEO EST SF 10: CPT | Performed by: FAMILY MEDICINE

## 2025-07-22 RX ORDER — PREDNISONE 20 MG/1
TABLET ORAL
Qty: 18 TABLET | Refills: 0 | Status: SHIPPED | OUTPATIENT
Start: 2025-07-22 | End: 2025-07-31

## 2025-07-22 RX ORDER — HYDROCODONE BITARTRATE AND ACETAMINOPHEN 10; 325 MG/1; MG/1
1 TABLET ORAL EVERY 6 HOURS PRN
Qty: 15 TABLET | Refills: 0 | Status: SHIPPED | OUTPATIENT
Start: 2025-07-22
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
## (undated) DIAGNOSIS — M10.9 ACUTE GOUT OF LEFT FOOT, UNSPECIFIED CAUSE: ICD-10-CM

## (undated) NOTE — ED AVS SNAPSHOT
Mr. Radha Cortez   MRN: [id number]    Department:  [address] in [facility] [place]   Date of Visit:  8/31/2018           Disclosure     Insurance plans vary and the physician(s) referred by the ER may not be covered by your plan.  Please c tell this physician (or your personal doctor if your instructions are to return to your personal doctor) about any new or lasting problems. The primary care or specialist physician will see patients referred from the [facility] Emergency Department.  Giovani Mcgrath